# Patient Record
Sex: MALE | HISPANIC OR LATINO | URBAN - METROPOLITAN AREA
[De-identification: names, ages, dates, MRNs, and addresses within clinical notes are randomized per-mention and may not be internally consistent; named-entity substitution may affect disease eponyms.]

---

## 2023-09-18 ENCOUNTER — TRANSFERRED RECORDS (OUTPATIENT)
Dept: HEALTH INFORMATION MANAGEMENT | Facility: CLINIC | Age: 33
End: 2023-09-18

## 2023-09-19 ENCOUNTER — TRANSCRIBE ORDERS (OUTPATIENT)
Dept: OTHER | Age: 33
End: 2023-09-19

## 2023-09-19 DIAGNOSIS — N52.9 INABILITY TO MAINTAIN ERECTION: Primary | ICD-10-CM

## 2023-09-23 NOTE — TELEPHONE ENCOUNTER
Action 2023 JTV 10:40 PM    Action Taken CSS sent an urgent request to [facility].   MEDICAL RECORDS REQUEST   [facility]  U[facility] Clinic  [address] [place] [address]  PHONE: [phone]  Fax: [phone]        FUTURE VISIT INFORMATION                                                   Sidneyamber Arreguin, : 1990 scheduled for future visit at [facility]    APPOINTMENT INFORMATION:  Date: 2023  Provider:  Alis Ragland PA-C  Reason for Visit/Diagnosis: ERECTILE DYSFUNCTION    REFERRAL INFORMATION:  Referring provider:  Charlotte Mae CNP @ [facility]  Clinic contact number:  Phone: [phone] Fax: [phone]    RECORDS REQUESTED FOR VISIT                                                     NOTES  STATUS/DETAILS   OFFICE NOTE from referring provider  Yes, 2023, 2023 -- Charlotte Mae CNP @ [facility]   MEDICATION LIST  yes     PRE-VISIT CHECKLIST      Joint diagnostic appointment coordinated correctly          (ensure right order & amount of time) Yes   RECORD COLLECTION COMPLETE yes

## 2023-11-06 ENCOUNTER — APPOINTMENT (OUTPATIENT)
Dept: INTERPRETER SERVICES | Facility: CLINIC | Age: 33
End: 2023-11-06

## 2023-11-17 ENCOUNTER — PRE VISIT (OUTPATIENT)
Dept: UROLOGY | Facility: CLINIC | Age: 33
End: 2023-11-17

## 2023-11-28 ENCOUNTER — PRE VISIT (OUTPATIENT)
Dept: UROLOGY | Facility: CLINIC | Age: 33
End: 2023-11-28